# Patient Record
Sex: MALE | Race: WHITE | NOT HISPANIC OR LATINO | Employment: FULL TIME | URBAN - METROPOLITAN AREA
[De-identification: names, ages, dates, MRNs, and addresses within clinical notes are randomized per-mention and may not be internally consistent; named-entity substitution may affect disease eponyms.]

---

## 2017-08-31 ENCOUNTER — OFFICE VISIT - HEALTHEAST (OUTPATIENT)
Dept: FAMILY MEDICINE | Facility: CLINIC | Age: 57
End: 2017-08-31

## 2017-08-31 DIAGNOSIS — K40.90 RIGHT INGUINAL HERNIA: ICD-10-CM

## 2017-08-31 ASSESSMENT — MIFFLIN-ST. JEOR: SCORE: 1857.73

## 2017-09-05 ENCOUNTER — COMMUNICATION - HEALTHEAST (OUTPATIENT)
Dept: SCHEDULING | Facility: CLINIC | Age: 57
End: 2017-09-05

## 2017-09-05 ENCOUNTER — COMMUNICATION - HEALTHEAST (OUTPATIENT)
Dept: FAMILY MEDICINE | Facility: CLINIC | Age: 57
End: 2017-09-05

## 2017-09-07 ENCOUNTER — OFFICE VISIT - HEALTHEAST (OUTPATIENT)
Dept: SURGERY | Facility: CLINIC | Age: 57
End: 2017-09-07

## 2017-09-07 DIAGNOSIS — K40.90 RIGHT INGUINAL HERNIA: ICD-10-CM

## 2017-09-07 ASSESSMENT — MIFFLIN-ST. JEOR: SCORE: 1862.26

## 2017-09-08 ENCOUNTER — AMBULATORY - HEALTHEAST (OUTPATIENT)
Dept: SURGERY | Facility: CLINIC | Age: 57
End: 2017-09-08

## 2017-09-08 ENCOUNTER — RECORDS - HEALTHEAST (OUTPATIENT)
Dept: ADMINISTRATIVE | Facility: OTHER | Age: 57
End: 2017-09-08

## 2017-09-08 DIAGNOSIS — K40.90 RIGHT INGUINAL HERNIA: ICD-10-CM

## 2017-09-21 ENCOUNTER — OFFICE VISIT - HEALTHEAST (OUTPATIENT)
Dept: SURGERY | Facility: CLINIC | Age: 57
End: 2017-09-21

## 2017-09-21 DIAGNOSIS — Z48.89 POSTOPERATIVE VISIT: ICD-10-CM

## 2017-09-21 ASSESSMENT — MIFFLIN-ST. JEOR: SCORE: 1862.26

## 2017-10-04 ENCOUNTER — COMMUNICATION - HEALTHEAST (OUTPATIENT)
Dept: SURGERY | Facility: CLINIC | Age: 57
End: 2017-10-04

## 2018-06-25 ENCOUNTER — OFFICE VISIT - HEALTHEAST (OUTPATIENT)
Dept: FAMILY MEDICINE | Facility: CLINIC | Age: 58
End: 2018-06-25

## 2018-06-25 DIAGNOSIS — R06.02 SHORTNESS OF BREATH: ICD-10-CM

## 2018-06-25 DIAGNOSIS — Z12.5 SCREENING FOR PROSTATE CANCER: ICD-10-CM

## 2018-06-25 DIAGNOSIS — Z77.090 ASBESTOS EXPOSURE: ICD-10-CM

## 2018-06-25 DIAGNOSIS — R07.9 CHEST PAIN: ICD-10-CM

## 2018-06-25 LAB
ALBUMIN SERPL-MCNC: 4 G/DL (ref 3.5–5)
ALP SERPL-CCNC: 57 U/L (ref 45–120)
ALT SERPL W P-5'-P-CCNC: 22 U/L (ref 0–45)
ANION GAP SERPL CALCULATED.3IONS-SCNC: 11 MMOL/L (ref 5–18)
AST SERPL W P-5'-P-CCNC: 17 U/L (ref 0–40)
BILIRUB SERPL-MCNC: 0.9 MG/DL (ref 0–1)
BUN SERPL-MCNC: 17 MG/DL (ref 8–22)
CALCIUM SERPL-MCNC: 9.8 MG/DL (ref 8.5–10.5)
CHLORIDE BLD-SCNC: 110 MMOL/L (ref 98–107)
CO2 SERPL-SCNC: 22 MMOL/L (ref 22–31)
CREAT SERPL-MCNC: 0.94 MG/DL (ref 0.7–1.3)
ERYTHROCYTE [DISTWIDTH] IN BLOOD BY AUTOMATED COUNT: 11.3 % (ref 11–14.5)
GFR SERPL CREATININE-BSD FRML MDRD: >60 ML/MIN/1.73M2
GLUCOSE BLD-MCNC: 97 MG/DL (ref 70–125)
HCT VFR BLD AUTO: 46.9 % (ref 40–54)
HGB BLD-MCNC: 15.4 G/DL (ref 14–18)
LDLC SERPL CALC-MCNC: 164 MG/DL
MCH RBC QN AUTO: 29.4 PG (ref 27–34)
MCHC RBC AUTO-ENTMCNC: 32.8 G/DL (ref 32–36)
MCV RBC AUTO: 90 FL (ref 80–100)
PLATELET # BLD AUTO: 332 THOU/UL (ref 140–440)
PMV BLD AUTO: 7.8 FL (ref 7–10)
POTASSIUM BLD-SCNC: 4.5 MMOL/L (ref 3.5–5)
PROT SERPL-MCNC: 7.3 G/DL (ref 6–8)
PSA SERPL-MCNC: 1.1 NG/ML (ref 0–3.5)
RBC # BLD AUTO: 5.24 MILL/UL (ref 4.4–6.2)
SODIUM SERPL-SCNC: 143 MMOL/L (ref 136–145)
WBC: 10.7 THOU/UL (ref 4–11)

## 2018-06-25 ASSESSMENT — MIFFLIN-ST. JEOR: SCORE: 1857.73

## 2018-06-28 ENCOUNTER — HOSPITAL ENCOUNTER (OUTPATIENT)
Dept: CT IMAGING | Facility: HOSPITAL | Age: 58
Discharge: HOME | End: 2018-06-28
Attending: FAMILY MEDICINE

## 2018-06-28 ENCOUNTER — COMMUNICATION - HEALTHEAST (OUTPATIENT)
Dept: FAMILY MEDICINE | Facility: CLINIC | Age: 58
End: 2018-06-28

## 2018-06-28 DIAGNOSIS — R07.9 CHEST PAIN: ICD-10-CM

## 2018-06-28 DIAGNOSIS — Z77.090 ASBESTOS EXPOSURE: ICD-10-CM

## 2018-07-06 ENCOUNTER — COMMUNICATION - HEALTHEAST (OUTPATIENT)
Dept: FAMILY MEDICINE | Facility: CLINIC | Age: 58
End: 2018-07-06

## 2018-11-14 ENCOUNTER — OFFICE VISIT - HEALTHEAST (OUTPATIENT)
Dept: FAMILY MEDICINE | Facility: CLINIC | Age: 58
End: 2018-11-14

## 2018-11-14 DIAGNOSIS — J31.0 CHRONIC RHINITIS: ICD-10-CM

## 2018-11-14 ASSESSMENT — MIFFLIN-ST. JEOR: SCORE: 1898.55

## 2019-05-20 ENCOUNTER — OFFICE VISIT - HEALTHEAST (OUTPATIENT)
Dept: FAMILY MEDICINE | Facility: CLINIC | Age: 59
End: 2019-05-20

## 2019-05-20 ENCOUNTER — AMBULATORY - HEALTHEAST (OUTPATIENT)
Dept: FAMILY MEDICINE | Facility: CLINIC | Age: 59
End: 2019-05-20

## 2019-05-20 DIAGNOSIS — H02.826 EYELID CYST, LEFT: ICD-10-CM

## 2019-05-20 ASSESSMENT — MIFFLIN-ST. JEOR: SCORE: 1912.16

## 2019-06-11 ENCOUNTER — OFFICE VISIT - HEALTHEAST (OUTPATIENT)
Dept: FAMILY MEDICINE | Facility: CLINIC | Age: 59
End: 2019-06-11

## 2019-06-11 ENCOUNTER — COMMUNICATION - HEALTHEAST (OUTPATIENT)
Dept: FAMILY MEDICINE | Facility: CLINIC | Age: 59
End: 2019-06-11

## 2019-06-11 DIAGNOSIS — S22.42XS CLOSED FRACTURE OF MULTIPLE RIBS OF LEFT SIDE, SEQUELA: ICD-10-CM

## 2019-06-11 ASSESSMENT — MIFFLIN-ST. JEOR: SCORE: 1889.48

## 2019-06-12 ENCOUNTER — COMMUNICATION - HEALTHEAST (OUTPATIENT)
Dept: FAMILY MEDICINE | Facility: CLINIC | Age: 59
End: 2019-06-12

## 2019-06-17 ENCOUNTER — COMMUNICATION - HEALTHEAST (OUTPATIENT)
Dept: FAMILY MEDICINE | Facility: CLINIC | Age: 59
End: 2019-06-17

## 2019-06-17 DIAGNOSIS — S22.42XS CLOSED FRACTURE OF MULTIPLE RIBS OF LEFT SIDE, SEQUELA: ICD-10-CM

## 2019-06-18 ENCOUNTER — AMBULATORY - HEALTHEAST (OUTPATIENT)
Dept: FAMILY MEDICINE | Facility: CLINIC | Age: 59
End: 2019-06-18

## 2019-06-18 DIAGNOSIS — S22.42XS CLOSED FRACTURE OF MULTIPLE RIBS OF LEFT SIDE, SEQUELA: ICD-10-CM

## 2019-06-24 ENCOUNTER — COMMUNICATION - HEALTHEAST (OUTPATIENT)
Dept: FAMILY MEDICINE | Facility: CLINIC | Age: 59
End: 2019-06-24

## 2019-06-24 DIAGNOSIS — S22.42XS CLOSED FRACTURE OF MULTIPLE RIBS OF LEFT SIDE, SEQUELA: ICD-10-CM

## 2019-06-25 ENCOUNTER — RECORDS - HEALTHEAST (OUTPATIENT)
Dept: SCHEDULING | Facility: CLINIC | Age: 59
End: 2019-06-25

## 2019-06-27 ENCOUNTER — COMMUNICATION - HEALTHEAST (OUTPATIENT)
Dept: FAMILY MEDICINE | Facility: CLINIC | Age: 59
End: 2019-06-27

## 2019-09-23 ENCOUNTER — OFFICE VISIT - HEALTHEAST (OUTPATIENT)
Dept: FAMILY MEDICINE | Facility: CLINIC | Age: 59
End: 2019-09-23

## 2019-09-23 DIAGNOSIS — S22.42XS CLOSED FRACTURE OF MULTIPLE RIBS OF LEFT SIDE, SEQUELA: ICD-10-CM

## 2019-09-23 DIAGNOSIS — M25.562 POSTERIOR LEFT KNEE PAIN: ICD-10-CM

## 2019-09-23 DIAGNOSIS — G56.02 CARPAL TUNNEL SYNDROME OF LEFT WRIST: ICD-10-CM

## 2019-09-23 DIAGNOSIS — J90 PLEURAL EFFUSION: ICD-10-CM

## 2019-09-23 DIAGNOSIS — L63.9 ALOPECIA AREATA: ICD-10-CM

## 2020-03-23 ENCOUNTER — OFFICE VISIT - HEALTHEAST (OUTPATIENT)
Dept: FAMILY MEDICINE | Facility: CLINIC | Age: 60
End: 2020-03-23

## 2020-03-23 DIAGNOSIS — J06.9 UPPER RESPIRATORY TRACT INFECTION, UNSPECIFIED TYPE: ICD-10-CM

## 2020-04-02 ENCOUNTER — COMMUNICATION - HEALTHEAST (OUTPATIENT)
Dept: FAMILY MEDICINE | Facility: CLINIC | Age: 60
End: 2020-04-02

## 2021-05-28 NOTE — PROGRESS NOTES
"ASSESMENT AND PLAN:  Diagnoses and all orders for this visit:    Eyelid cyst, left  Counseled the patient on options including observation and intervention.  He would like to proceed with intervention.  We reviewed the risks and benefits of proceeding and the patient agreed to proceed.  The skin was prepped with rubbing alcohol, the eye was shielded with a gloved hand and gauze, the cyst was ruptured using a sterile TB syringe and produced clear liquid and a scant amount of blood.  This resulted in complete resolution of the cyst, aftercare instructions discussed with the patient and we reviewed indications for routine and emergent follow-up.        SUBJECTIVE: 58-year-old male with a 6-week history of a slowly increasing in size spot on the nasal margin of his left upper eyelid.  He is been trying warm compresses and despite this it has been getting bigger.  It starting to bother him.  No fevers, no spreading redness, no drainage, there is not disruptive to his vision.  It is not painful.  However, he can feel that it is there and it is uncomfortable for him.    Past Medical History:   Diagnosis Date     Basal Cell Carcinoma Of The Skin Of The Trunk     Created by Conversion      Inguinal hernia     on the right     Patient Active Problem List   Diagnosis     Basal Cell Carcinoma Of The Skin Of The Trunk     Benign Pigmented Nevus     Epidermal Inclusion Cyst     Asbestos exposure     Current Outpatient Medications   Medication Sig Dispense Refill     multivitamin with minerals (THERA-M) 9 mg iron-400 mcg Tab tablet Take 1 tablet by mouth daily.       fluticasone (FLONASE) 50 mcg/actuation nasal spray 1 spray into each nostril daily. 16 g 6     No current facility-administered medications for this visit.      Social History     Tobacco Use   Smoking Status Never Smoker   Smokeless Tobacco Never Used       OBJECTICE: /70   Pulse 84   Temp 97.7  F (36.5  C) (Oral)   Resp 20   Ht 5' 9\" (1.753 m)   Wt (!) " 244 lb (110.7 kg)   SpO2 96%   BMI 36.03 kg/m       No results found for this or any previous visit (from the past 24 hour(s)).     GEN-alert, appropriate, in no apparent distress   Eyes- normal eye exam bilaterally with the limitations of office equipment and undilated pupil.   SKIN-the left upper eyelid at the nasal margin has a 2 mm diameter clear raised cyst with no surrounding induration or erythema.      Emerson Lai

## 2021-05-29 NOTE — TELEPHONE ENCOUNTER
Received FMLA paperwork from pt.  CMT filled out what can,  Highlighted what MD needs to fill out.  Gave to CA who placed with apt paperwork. Thanks.

## 2021-05-29 NOTE — PROGRESS NOTES
ASSESMENT AND PLAN:  Diagnoses and all orders for this visit:    Closed fracture of multiple ribs of left side, sequela  Extensive counseling today with the patient around the use of the below prescribed medication for the most severe breakthrough pain, he is going to start the transition to ibuprofen 600 mg per dose with food every 6 hours as needed for his mainstay of pain control and will reserve the refill on his oxycodone acetaminophen for the only the more severe pain or pain disrupting his sleep not controlled with ibuprofen.  Reviewed the emergency room records extensively with the patient, he has multiple rib fractures and is experiencing severe pain as detailed below, he was cautioned on the risks and benefits of the medication as prescribed below and agrees not to drive after taking the medication.    Extensive counseling care coordination around his work-related documentation required for having time off work for the sequelae from this injury.  The patient works in a very physically demanding job as a  where he has to do a lot of climbing and pulling himself up with his upper extremities, even doing low force light movements are causing him some severe pain.  Therefore, he is not able to do any components of his job and he does get some cognitive/alertness related issues related to taking the pain medications.  4 separate packets were completed with the patient detailing his medical situation and expected time off work, we are estimating his return to work date as 7/13/2019-please see scanned copies of those packets for further details.    Patient counseled extensively on very slow return to everyday activities and precautions for avoiding reinjury or exacerbation of his pain.  Also counseled on the importance of continued use of the incentive spirometry to reduce pneumonia risk.  We discussed indications for routine and emergent follow-up.    -     oxyCODONE-acetaminophen  (PERCOCET/ENDOCET) 5-325 mg per tablet; Take 1 tablet by mouth every 4 (four) hours as needed for pain.  Dispense: 30 tablet; Refill: 0    Over 40 minutes of total face-to-face time, more than half in counseling and coordination of care on the above.     SUBJECTIVE: 58-year-old male who was injured in a severe fall, see emergency room notes for details.  He broke 4 ribs.  Patient reports that he discussed admission to the hospital with the ER provider but patient decided he wants to manage this at home.  He does feel like there has been some day-to-day improvement in the severity of his pain but he continues to experience severe left-sided chest wall pain that is made intolerable with things like laughing or coughing as well as some movements of the arms and trunk.  Patient also reports that he has had some very severe pain with spasming of the muscles in the trunk.  He has had good improvement in the most severe pain from the oxycodone acetaminophen that was prescribed by the ER physician.  He tolerates the medication well.  He has not had fevers or shortness of breath.  He is been doing the incentive spirometry as directed and doing well with this.    Past Medical History:   Diagnosis Date     Basal Cell Carcinoma Of The Skin Of The Trunk     Created by Conversion      Inguinal hernia     on the right     Patient Active Problem List   Diagnosis     Basal Cell Carcinoma Of The Skin Of The Trunk     Benign Pigmented Nevus     Epidermal Inclusion Cyst     Asbestos exposure     Current Outpatient Medications   Medication Sig Dispense Refill     multivitamin with minerals (THERA-M) 9 mg iron-400 mcg Tab tablet Take 1 tablet by mouth daily.       oxyCODONE-acetaminophen (PERCOCET/ENDOCET) 5-325 mg per tablet Take 1 tablet by mouth every 4 (four) hours as needed for pain. 30 tablet 0     fluticasone (FLONASE) 50 mcg/actuation nasal spray 1 spray into each nostril daily. 16 g 6     No current facility-administered  "medications for this visit.      Social History     Tobacco Use   Smoking Status Never Smoker   Smokeless Tobacco Never Used       OBJECTICE: /80 (Patient Site: Right Arm, Patient Position: Sitting, Cuff Size: Adult Large)   Pulse 80   Temp 97.7  F (36.5  C) (Oral)   Resp 16   Ht 5' 9\" (1.753 m)   Wt (!) 239 lb (108.4 kg)   BMI 35.29 kg/m       No results found for this or any previous visit (from the past 24 hour(s)).     GEN-alert, appropriate, in no apparent distress   Musculoskeletal- tenderness to light touch of the left chest wall   CV-regular rate and rhythm   RESP-lungs clear to auscultation   ABDOMINAL-soft and nontender to palpation with no palpable mass   SKIN-extensive ecchymoses over the left chest wall skin integrity.      Emerson Lai          "

## 2021-05-29 NOTE — TELEPHONE ENCOUNTER
Final form faxed to [facility].  Completed what could.  Attached med list and ER record.  Highlighted what PCP needs to complete.  Pt coming in to complete first page of form. Thanks.

## 2021-05-29 NOTE — TELEPHONE ENCOUNTER
[employer] and one other form faxed to [facility] for pt apt today at 240 pm.  Filled out what [facility] could and placed with other paperwork. PCP and CA aware.  Thanks.

## 2021-05-29 NOTE — TELEPHONE ENCOUNTER
Patient calling again requesting refill of pain medication: oxycodone for broken ribs.  Patient states he called early this morning.     Pharmacy confirmed with caller.    Sheba Sin RN  Triage Nurse Advisor

## 2021-05-29 NOTE — TELEPHONE ENCOUNTER
Refill Request  Did you contact pharmacy: No  Medication name: percocet    Who prescribed the medication: [place]     Pharmacy Name and Location: [facility]  Is patient out of medication: Yes  Patient notified refills processed in 72 hours:  yes  Okay to leave a detailed message: yes

## 2021-05-29 NOTE — TELEPHONE ENCOUNTER
Name of form/paperwork: Other:  workability form is due again in two weeks.  Does he need to schedule an appointment to get the paperwork completed? Please advise.   Have you been seen for this request: No  Do we have the form: Patient will either drop off or bring in if need appointment.   When is form needed by: two weeks from now  How would you like the form returned: see form  Fax Number: see form  Patient Notified form requests are processed in 3-5 business days: NA  (If patient needs form sooner, please note that in this message.)  Okay to leave a detailed message? Yes [phone]

## 2021-05-29 NOTE — TELEPHONE ENCOUNTER
Upcoming Appointment Question  When is the appointment: Today, 2:40  What is your appointment for?: EDF  Who is your appointment scheduled with?: PCP only, Dr Lai  What is your question/concern?: Patient requests someone on Dr Lai team contact him as soon as possible. He has electronic documents containing forms he needs to get to the doctor and is looking for a way to get them over.  Okay to leave a detailed message?: Yes

## 2021-05-29 NOTE — TELEPHONE ENCOUNTER
Controlled Substance Refill Request  Medication:   Requested Prescriptions     Pending Prescriptions Disp Refills     oxyCODONE-acetaminophen (PERCOCET/ENDOCET) 5-325 mg per tablet 30 tablet 0     Sig: Take 1 tablet by mouth every 4 (four) hours as needed for pain.     Date Last Fill: 6/18/2019    Pharmacy: [id number] (confirmed pharmacy with encounter creator)   Submit electronically to pharmacy  Controlled Substance Agreement on File:   Encounter-Level CSA Scan Date:    There are no encounter-level csa scan date.       Last office visit: 6/11/2019 Emerson Lai MD

## 2021-05-29 NOTE — TELEPHONE ENCOUNTER
Controlled Substance Refill Request  Medication Name:   Requested Prescriptions     Pending Prescriptions Disp Refills     oxyCODONE-acetaminophen (PERCOCET/ENDOCET) 5-325 mg per tablet 30 tablet 0     Sig: Take 1 tablet by mouth every 4 (four) hours as needed for pain.     Date Last Fill: 6/11/19  Pharmacy: [facility]      Submit electronically to pharmacy  Controlled Substance Agreement Date Scanned:   Encounter-Level CSA Scan Date:    There are no encounter-level csa scan date.       Last office visit with prescriber/PCP: 6/11/2019 Emerson Lai MD OR same dept: 6/11/2019 Emerson Lai MD OR same specialty: 6/11/2019 Emerson Lai MD  Last physical: Visit date not found Last MTM visit: Visit date not found

## 2021-05-29 NOTE — TELEPHONE ENCOUNTER
Called pt to relay did not receive 4th form from [employer].  Waiting on form and pt asked to be called when form received.  Relayed PCP is gone next week.  Pt will  form and wants copy of this form when completed. Thanks.

## 2021-05-29 NOTE — TELEPHONE ENCOUNTER
Called pt who states faxed two forms yesterday - [employer] disability and STD from [employer] to [facility].  Lelo is looking but did not find them.  Pt has 4 more forms that were emailed to him.  Unfortunately will need them all faxed.  Gave pt CMT direct fax [phone] and pt said he may ask for hard copies and drop off next week.      Just ADOLPH for PCP and CA.  Thanks.

## 2021-05-30 NOTE — TELEPHONE ENCOUNTER
Called pt to relay PCP message. Understood. Relayed forms take 3-5 days to complete and will call when ready.  Pt would like a copy for self. Thanks.

## 2021-05-30 NOTE — TELEPHONE ENCOUNTER
Patient left [employer] Form to , stating Dr. Lai would be expecting it.    Given to Provider.

## 2021-05-30 NOTE — TELEPHONE ENCOUNTER
Faxed the completed paperwork. Left detail message on pt's voicemail to return call pt want the paperwork back.

## 2021-05-31 VITALS — BODY MASS INDEX: 34.51 KG/M2 | WEIGHT: 233 LBS | HEIGHT: 69 IN

## 2021-05-31 VITALS — HEIGHT: 69 IN | WEIGHT: 233 LBS | BODY MASS INDEX: 34.51 KG/M2

## 2021-05-31 VITALS — HEIGHT: 69 IN | WEIGHT: 232 LBS | BODY MASS INDEX: 34.36 KG/M2

## 2021-06-01 VITALS — WEIGHT: 232 LBS | BODY MASS INDEX: 34.36 KG/M2 | HEIGHT: 69 IN

## 2021-06-01 NOTE — PROGRESS NOTES
Assessment/Plan:        1. Closed fracture of multiple ribs of left side, sequela/  Pleural effusion  - history of multiple rib fracture due to a fall  Seen in ED initially on 6/5/19    Exam findings were discussed   Plan:   - XR Chest 2 Views  We will follow-up to the results of the study and manage accordingly.      2.Posterior left knee pain  Consider strain vs Baker's cyst   He plans to watch it for now, and follow up with any worsening symptoms.        3. Alopecia areata  Exam findings were discussed  Likely due to stressed caused by #1  - observation     4. Carpal tunnel syndrome of left wrist  Exam findings and pathophysiology were discussed   Recommended splinting for 4-6 wks, but due to his work opted out for now.   Plans to observe for now and follow up upon worsening.     At the conclusion of the encounter the plan of care, disposition and all questions were answered and reviewed, and the patient acknowledged understanding and was involved in the decision making regarding the overall care plan.           Subjective:    Patient ID:   Fortino Ashton is a 59 y.o. male here for the following issues:       Follow-up on rib pain/multiple rib fracture due to a fall  Seen in ED initially on 6/5/19, treated with Percocet.  X-ray of the chest and posterior ribs showed evidence of two anterior left rib fractures and two posterior left rib fractures.  With also evidence of a small pleural effusion in the left lung which is likely a hemothorax          Hair loss noted for the past month  Having a two almost palm size patchy balded area on the back of the head        Numbness and tingling of the left hand fingertips since early August  He reports to spending some time in the [place] wondering if that had to do with it.            Posterior left leg pain for the past 4 days worse in the morning  For no good reason, starting to feel sore.   Worse with more using it extending into the calf. 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               Review of Systems  Allergy: reviewed  General : negative  A complete 10 point review of systems was obtained and is negative other than what is stated in the HPI.        The following patient's history were reviewed and updated as appropriate:  I have reviewed the patient's medical, surgical , and social history in detail         Outpatient Encounter Medications as of 9/23/2019   Medication Sig Dispense Refill     multivitamin with minerals (THERA-M) 9 mg iron-400 mcg Tab tablet Take 1 tablet by mouth daily.       [DISCONTINUED] fluticasone (FLONASE) 50 mcg/actuation nasal spray 1 spray into each nostril daily. 16 g 6     [DISCONTINUED] oxyCODONE-acetaminophen (PERCOCET/ENDOCET) 5-325 mg per tablet Take 1 tablet by mouth every 4 (four) hours as needed for pain. 30 tablet 0     No facility-administered encounter medications on file as of 9/23/2019.          Objective:   /76 (Patient Site: Right Arm, Patient Position: Sitting, Cuff Size: Adult Large)   Pulse 84   Wt (!) 244 lb (110.7 kg)   SpO2 96%   BMI 36.03 kg/m        Physical Exam  General Appearance:    Alert, well hydrated, no distress,    Neck:   Supple, symmetrical, trachea midline, no adenopathy;        thyroid:  No enlargement/tenderness/nodules; no carotid    bruit or JVD   Chest wall/ Lungs:     Clear to auscultation bilaterally, respirations unlabored, no overlying bruising or ecchymosis, tender to palpation on the left side.    Heart:    Regular rate and rhythm, S1 and S2 normal, no murmur, rub   or gallop   Abdomen:     Soft, non-tender, normal bowel sounds, no rebound or guarding, no masses, no organomegaly   Left wrist/ Left knee:    Left wrist: normal to inspection, + phalen's test, normal DTR, left knee:   Normal to inspection, atraumatic, no edema, palpable tenderness to the posterior knee, no calf swelling   Skin:   Two almost palm  sized near bald patchy areas on the back side the head, Skin color, texture, turgor normal, no rashes or lesions

## 2021-06-02 VITALS — WEIGHT: 241 LBS | HEIGHT: 69 IN | BODY MASS INDEX: 35.7 KG/M2

## 2021-06-02 VITALS — HEIGHT: 69 IN | WEIGHT: 244 LBS | BODY MASS INDEX: 36.14 KG/M2

## 2021-06-02 VITALS — BODY MASS INDEX: 35.4 KG/M2 | WEIGHT: 239 LBS | HEIGHT: 69 IN

## 2021-06-03 VITALS
WEIGHT: 244 LBS | SYSTOLIC BLOOD PRESSURE: 122 MMHG | BODY MASS INDEX: 36.03 KG/M2 | OXYGEN SATURATION: 96 % | HEART RATE: 84 BPM | DIASTOLIC BLOOD PRESSURE: 76 MMHG

## 2021-06-07 NOTE — TELEPHONE ENCOUNTER
Called pt to relay CMT fax number.  FMLA is related to note under Media by PCP.  Forms not coming until Monday 4/6/20.  Thanks.

## 2021-06-07 NOTE — PROGRESS NOTES
"ASSESMENT AND PLAN:  Diagnoses and all orders for this visit:    Upper respiratory tract infection, unspecified type  Likely a viral upper respiratory infection.  No lower respiratory symptoms on review of systems as detailed below.  Detailed counseling with the patient on differential diagnosis and extra precautions with transmission precautions given the current covid-19 pandemic.  We are going to keep him off work for a more prolonged period than would be usual with these types of symptoms just to take those precautions.  A handwritten note was faxed to his employer stating that he has \"medically excused absences from 3/20/2020 through 4/2/2020 with return to work with no restrictions on 4/3/2020.\"      Reviewed the risks and benefits of the treatment plan with the patient and/or caregiver and we discussed indications for routine and emergent follow-up.  This was a telephone visit because of ongoing concerns around the pandemic.  Total telephone time with the patient was 13 minutes.        SUBJECTIVE: 59-year-old male with a 4-day history of sore throat, mild cough, mild congestion.  No fevers or chills.  No shortness of breath or chest pain.  Patient had previously been getting care for a severe rib fracture, see previous note for details, his pain from that had resolved completely prior to onset of the symptoms.  His alopecia that he was seen for previously has also been improving.  His symptoms have been improving over the last day, patient is mainly concerned about the possibility of covid-19 and how she he should handle returning to work given the possibility of exposing other people.    Past Medical History:   Diagnosis Date     Basal Cell Carcinoma Of The Skin Of The Trunk     Created by Conversion      Inguinal hernia     on the right     Patient Active Problem List   Diagnosis     Basal Cell Carcinoma Of The Skin Of The Trunk     Benign Pigmented Nevus     Epidermal Inclusion Cyst     Asbestos exposure "     Current Outpatient Medications   Medication Sig Dispense Refill     multivitamin with minerals (THERA-M) 9 mg iron-400 mcg Tab tablet Take 1 tablet by mouth daily.       No current facility-administered medications for this visit.      Social History     Tobacco Use   Smoking Status Never Smoker   Smokeless Tobacco Never Used             Emerson Lai

## 2021-06-07 NOTE — TELEPHONE ENCOUNTER
Called pt to relay [facility] paperwork completed and faxed back to [facility].  Successful fax confirmation received.  Thanks.

## 2021-06-12 NOTE — PROGRESS NOTES
"ASSESMENT AND PLAN:  Diagnoses and all orders for this visit:    Right inguinal hernia  -     Ambulatory referral to General Surgery  Additional exam done as detailed below, if the patient requires surgery and requires a preoperative evaluation, he is cleared to proceed with surgery with any appropriate anesthesia based on today's examination as documented below.  She was instructed on avoidance of aspirin and NSAIDs from now until surgery.      Patient was also given a note taking him off work.  Patient requested that I include in the note his diagnosis and that he has been referred to a surgeon.  The note stated that I estimated he would be off work 2-3 weeks but return to work details would need to be per the surgeon's recommendation.    Health maintenance  Patient gets his yearly health maintenance visits through his \"company doctor\" at his job and reports that he has been up-to-date on all of his immunizations.  We were able to get his colonoscopy report and reviewed that today- colonoscopy report indicates that he had colonoscopy in May 2016.  He had 2 adenomatous polyps at that time and it was recommended he have another colonoscopy in 5 years.    SUBJECTIVE: 56-year-old male with a three-week history of pain in his right groin and testicle.  It started while he was on a camping trip in [place] and had been doing a lot of packing and unpacking and moderate lifting and physical activity.  However, there was no specific heavy straining or injury that he can recall.  The pain is gotten progressively worse over the last week.  He now has moderate to severe pain in his right groin that waxes and wanes.  He describes it as a burning pain with sometimes a sharp stabbing quality.  The pain does get worse when he strains his abdominal muscles or lifts.  He does have a physically active job that requires a lot of moving around and physical work and he was unable to complete his work over the last few days because of " "the worsening pain.  The pain improves if he is able to support his scrotum.  Repositioning himself can be painful at times.  He does not have any past history of any similar symptoms and has not been having any dysuria or hematuria.  No constipation.  No new sexual partners.    Review of systems: No chest pain or shortness of breath.  No fevers or vomiting.  No constipation or diarrhea.  No blood in the urine or blood in the stool.  Remainder of review of systems is as above are negative.    Past Medical History:   Diagnosis Date     Basal Cell Carcinoma Of The Skin Of The Trunk     Created by Conversion      Patient Active Problem List   Diagnosis     Basal Cell Carcinoma Of The Skin Of The Trunk     Benign Pigmented Nevus     Epidermal Inclusion Cyst     No current outpatient prescriptions on file.     No current facility-administered medications for this visit.      History   Smoking Status     Never Smoker   Smokeless Tobacco     Not on file       OBJECTICE: /80 (Patient Site: Right Arm, Patient Position: Sitting, Cuff Size: Adult Regular)  Pulse 70  Temp 97.8  F (36.6  C) (Oral)   Resp 18  Ht 5' 9\" (1.753 m)  Wt (!) 232 lb (105.2 kg)  BMI 34.26 kg/m2     No results found for this or any previous visit (from the past 24 hour(s)).     GEN-alert, appropriate, in no apparent distress   HEENT-oropharynx is clear, neck is supple with no palpable mass or lymphadenopathy   CV-regular rate and rhythm with no murmur   RESP-lungs clear to auscultation   ABDOMINAL-soft, nontender, no palpable masses organomegaly   Genitourinary-normal testicular exam bilaterally, normal external genitalia, right inguinal hernia on exam, no palpable left inguinal hernia.     EXTREM-warm with no edema   SKIN-no vesicles or ulcers.      Emerson Lai          "

## 2021-06-12 NOTE — PROGRESS NOTES
HPI:  Fortino Ashton is a 56 y.o. male who was referred to me by Emerson Lai MD for an inguinal hernia. He  presents today with complaints of significant right inguinal pain for the past 4 weeks.  Pain began during a camping trip when he is doing a lot of heavy physical activity.  It persisted after this camping trip, and became increasingly severe, with a burning pain in the right groin that radiated down to his right testicle.  He notes this pain is currently exacerbated by sitting upright, driving and is relieved by standing or laying flat.  Denies any symptoms of dysuria or recent sexual contact.  Due to the severity of pain, he presented to the emergency department 2 days ago for evaluation where he was found to have a nonincarcerated right inguinal hernia.  He was given a prescription for narcotic pain medication which she reports has been quite helpful.    No prior intra-abdominal surgeries.    Allergies:Pollen    Past Medical History:   Diagnosis Date     Basal Cell Carcinoma Of The Skin Of The Trunk     Created by Conversion      Inguinal hernia     on the right       Past Surgical History:   Procedure Laterality Date     BASAL CELL CARCINOMA EXCISION         CURRENT MEDS:  Current Outpatient Prescriptions   Medication Sig Dispense Refill     acetaminophen (TYLENOL) 500 MG tablet Take 500 mg by mouth every 6 (six) hours as needed for pain.       docusate sodium (COLACE) 100 MG capsule Take 1 capsule (100 mg total) by mouth 2 (two) times a day as needed (Take when using oxycodone for severe pain). 20 capsule 0     multivitamin with minerals (THERA-M) 9 mg iron-400 mcg Tab tablet Take 1 tablet by mouth daily.       oxyCODONE-acetaminophen (PERCOCET) 5-325 mg per tablet Take 1-2 tablets by mouth every 6 (six) hours as needed (Severe pain). 15 tablet 0     No current facility-administered medications for this visit.        No family history on file.,  History not pertinent to current surgical issue     reports  "that he has never smoked. He has never used smokeless tobacco. He reports that he drinks alcohol. He reports that he does not use illicit drugs.    Review of Systems -   The 10 point review of systems  is within normal limits except for as mentioned above in the HPI.  General ROS: No complaints or constitutional symptoms  Skin: No complaints or symptoms   Hematologic/Lymphatic: No symptoms or complaints  Psychiatric: No symptoms or complaints  Endocrine: No excessive fatigue, no hypermetabolic symptoms reported  Respiratory ROS: no cough, shortness of breath, or wheezing  Cardiovascular ROS: no chest pain or dyspnea on exertion  Gastrointestinal ROS: As per HPI  Musculoskeletal ROS: no recent injuries reported  Neurological ROS: no focal neurologic defects reported.        BP (!) 154/99  Pulse 86  Ht 5' 9\" (1.753 m)  Wt (!) 233 lb (105.7 kg)  SpO2 97%  BMI 34.41 kg/m2  Body mass index is 34.41 kg/(m^2).    EXAM:  General : Alert, cooperative, appears stated age   Skin: Skin color, texture, turgor normal, no rashes or lesions   Lymphatic: No obvious adenopathy, no swelling   Eyes: No scleral icterus, pupils equal  HENT: no traumatic injury to the head or face, no gross abnormalities  Lungs: Normal respiratory effort, breath sounds equal bilaterally  Heart: Regular rate and rhythm  Abdomen: Soft, obese.  No discomfort with palpation the left groin, no palpable left inguinal hernia.  On the right, there is tenderness to palpation of both testicle and right external inguinal ring, with a palpable descent of hernia sac with Valsalva.  Musculoskeletal: No obvious swelling,  Neurologic: Grossly intact      Assessment/Plan:   1. Right inguinal hernia        Fortino Ashton is a 56 y.o. male with a very symptomatic right inguinal hernia inguinal hernia.  I have discussed the pathophysiology of inguinal hernias at length as well as the  surgical and non-operative management strategies.      In particular, the risks and " benefits of laparoscopic vs. open inguinal hernia surgery were explained in detail which include, but are not limited to, bleeding, infection of the mesh, recurrence of the hernia, chronic pain, poor cosmesis, the need for reoperative intervention, the possibility of conversion from a laparoscopic approach to an open approach, subcutaneous emphysema, injury to vital structures,  blood clots, heart attack, stroke and death.  Additionally, the risks of observation were also discussed in detail which include, but are not limited to, chronic pain, enlargement of the hernia, incarceration, strangulation and death.      He understands everything that was discussed and has consented to proceed with surgery.   We will plan on scheduling a laparoscopic right inguinal hernia repair at his desired date.  He is already obtained clearance for anesthesia by his PCP.      Dom Kim MD  [phone]  [facility] Department of Surgery

## 2021-06-13 NOTE — PROGRESS NOTES
"HPI: Pt is here for follow up of a lap right inguinal hernia repair.   he is doing well.  Pain is well controlled.  No difficulties with the surgical wound/wounds.  he is eating well and denies fever and chills.         /90  Pulse 80  Ht 5' 9\" (1.753 m)  Wt (!) 233 lb (105.7 kg)  SpO2 96%  BMI 34.41 kg/m2    EXAM:  GENERAL:Appears well  ABDOMEN:  Soft, +BS  SURGICAL WOUNDS:  Incisions healing well, no enduration or drainage.        Assessment/Plan: . Doing well after surgery and should follow up as needed.  Azra Martinez PA-C  [facility] Department of Surgery    "

## 2021-06-16 PROBLEM — Z77.090 ASBESTOS EXPOSURE: Status: ACTIVE | Noted: 2018-06-25

## 2021-06-18 NOTE — PROGRESS NOTES
ASSESMENT AND PLAN:  Diagnoses and all orders for this visit:    Chest pain, Shortness of breath, history of asbestos exposure and other chemical exposures as well as smoking history  -     CT Chest With Contrast; Future; Expected date: 6/25/18  -     Comprehensive Metabolic Panel  -     HM2(CBC w/o Differential)  -     LDL Cholesterol, Direct      Screening for prostate cancer  -     PSA (Prostatic-Specific Antigen), Annual Screen  I recommended the patient come in for a full health maintenance visit yearly.          SUBJECTIVE: 57-year-old male who had a blood pressure reading of 146 systolic at his physical that is done regularly for his employer.  He comes in today for follow-up on that.  Blood pressure is improved.  Patient reports that over the past 1-2 years, he has been getting intermittent mild symptoms of shortness of breath when he lays on his side when trying to sleep.  In addition, he gets a mild discomfort across the upper abdomen and lower chest along the lower edge of his rib cage anteriorly.  This sometimes worsens with coughing.  He is worried because he has significant pulmonary exposure history, he smoked for about 20 years, averaging less than one pack per day.  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  No hemoptysis.  No exertional trigger of his chest symptoms or decreased exercise tolerance.    Past Medical History:   Diagnosis Date     Basal Cell Carcinoma Of The Skin Of The Trunk     Created by Conversion      Inguinal hernia     on the right     Patient Active Problem List   Diagnosis     Basal Cell Carcinoma Of The Skin Of The Trunk     Benign Pigmented Nevus     Epidermal Inclusion Cyst     Asbestos exposure     Current Outpatient Prescriptions   Medication Sig Dispense Refill     acetaminophen (TYLENOL) 500 MG tablet Take 500 mg by mouth every 6 (six) hours as needed for pain.       multivitamin with  "minerals (THERA-M) 9 mg iron-400 mcg Tab tablet Take 1 tablet by mouth daily.       No current facility-administered medications for this visit.      History   Smoking Status     Never Smoker   Smokeless Tobacco     Never Used       OBJECTICE: /66 (Patient Site: Left Arm, Patient Position: Sitting, Cuff Size: Adult Large)  Pulse 87  Temp 98.8  F (37.1  C) (Oral)   Resp 20  Ht 5' 9\" (1.753 m)  Wt (!) 232 lb (105.2 kg)  SpO2 97%  BMI 34.26 kg/m2     Recent Results (from the past 24 hour(s))   HM2(CBC w/o Differential)    Collection Time: 06/25/18  4:10 PM   Result Value Ref Range    WBC 10.7 4.0 - 11.0 thou/uL    RBC 5.24 4.40 - 6.20 mill/uL    Hemoglobin 15.4 14.0 - 18.0 g/dL    Hematocrit 46.9 40.0 - 54.0 %    MCV 90 80 - 100 fL    MCH 29.4 27.0 - 34.0 pg    MCHC 32.8 32.0 - 36.0 g/dL    RDW 11.3 11.0 - 14.5 %    Platelets 332 140 - 440 thou/uL    MPV 7.8 7.0 - 10.0 fL        GEN-alert, appropriate, in no apparent distress   CV-regular rate and rhythm with no murmur   RESP-lungs clear to auscultation   ABDOMINAL-soft, nontender, no palpable mass organomegaly   EXTREM-warm with no edema   SKIN-no ulcers or vesicles overlying his areas of symptoms      Emerson Lai          "

## 2021-06-21 NOTE — PROGRESS NOTES
ASSESMENT AND PLAN:  Diagnoses and all orders for this visit:    Chronic rhinitis  Likely allergic trigger.  Reviewed differential diagnosis with the patient and discussed treatment options.  We are going to use medication as prescribed below.  Discussed risks and benefits of the medication and indications for follow-up.  If he responds poorly to the treatment will consider allergy testing.  -     fluticasone (FLONASE) 50 mcg/actuation nasal spray; 1 spray into each nostril daily.  Dispense: 16 g; Refill: 6          SUBJECTIVE: 58-year-old male who I had seen previously for some cough, chest pain, and shortness of breath issues.  He had a negative workup and all of his symptoms have resolved completely.  Over this past several months he has been getting intermittent symptoms of nasal congestion, watery runny nose, mildly itchy eyes bilaterally.  There is been no associated wheezing or shortness of breath or fever or chest pain.  The symptoms started around the time that his roommate moved in with dogs.  The patient does have a history of some allergies related to cat dander.  In the past, he is gotten good relief of his symptoms with Claritin but the current symptoms have not improved adequately with Claritin.  He feels like he has nasal congestion every night and it is difficult for him to be through his nose.    Past Medical History:   Diagnosis Date     Basal Cell Carcinoma Of The Skin Of The Trunk     Created by Conversion      Inguinal hernia     on the right     Patient Active Problem List   Diagnosis     Basal Cell Carcinoma Of The Skin Of The Trunk     Benign Pigmented Nevus     Epidermal Inclusion Cyst     Asbestos exposure     Current Outpatient Medications   Medication Sig Dispense Refill     multivitamin with minerals (THERA-M) 9 mg iron-400 mcg Tab tablet Take 1 tablet by mouth daily.       acetaminophen (TYLENOL) 500 MG tablet Take 500 mg by mouth every 6 (six) hours as needed for pain.        "fluticasone (FLONASE) 50 mcg/actuation nasal spray 1 spray into each nostril daily. 16 g 6     No current facility-administered medications for this visit.      Social History     Tobacco Use   Smoking Status Never Smoker   Smokeless Tobacco Never Used       OBJECTICE: /70 (Patient Site: Right Arm, Patient Position: Sitting, Cuff Size: Adult Large)   Pulse 77   Temp 98  F (36.7  C) (Oral)   Resp 16   Ht 5' 9\" (1.753 m)   Wt (!) 241 lb (109.3 kg)   SpO2 96%   BMI 35.59 kg/m       No results found for this or any previous visit (from the past 24 hour(s)).     GEN-alert, appropriate, in no apparent distress   HEENT-puffiness and redness of the nasal mucosa bilaterally, neck is supple with no palpable mass or lymphadenopathy   CV-regular rate and rhythm with no murmur   RESP-lungs clear to auscultation   SKIN-no rash or hives      Emerson Lai          "

## 2021-06-23 ENCOUNTER — RECORDS - HEALTHEAST (OUTPATIENT)
Dept: ADMINISTRATIVE | Facility: OTHER | Age: 61
End: 2021-06-23

## 2021-07-28 ENCOUNTER — OFFICE VISIT (OUTPATIENT)
Dept: FAMILY MEDICINE | Facility: CLINIC | Age: 61
End: 2021-07-28
Payer: COMMERCIAL

## 2021-07-28 VITALS
WEIGHT: 235.5 LBS | BODY MASS INDEX: 35.69 KG/M2 | OXYGEN SATURATION: 100 % | HEART RATE: 93 BPM | SYSTOLIC BLOOD PRESSURE: 136 MMHG | HEIGHT: 68 IN | TEMPERATURE: 98.3 F | DIASTOLIC BLOOD PRESSURE: 76 MMHG

## 2021-07-28 DIAGNOSIS — S46.811A STRAIN OF RIGHT DELTOID MUSCLE, INITIAL ENCOUNTER: Primary | ICD-10-CM

## 2021-07-28 PROCEDURE — 99214 OFFICE O/P EST MOD 30 MIN: CPT | Performed by: FAMILY MEDICINE

## 2021-07-28 ASSESSMENT — MIFFLIN-ST. JEOR: SCORE: 1859.47

## 2021-08-01 NOTE — PROGRESS NOTES
"    Assessment & Plan     Strain of right deltoid muscle, initial encounter    Functional abilities form was completed and faxed to [employer].    FMLA form was also received and is being completed and faxed to [facility].      34 minutes spent on the date of the encounter doing chart review, patient visit and documentation regarding deltoid muscle strain.       BMI:   Estimated body mass index is 35.69 kg/m  as calculated from the following:    Height as of this encounter: 1.73 m (5' 8.11\").    Weight as of this encounter: 106.8 kg (235 lb 8 oz).           Return in about 1 year (around 7/28/2022) for Routine preventive.    Galen Hernandez MD, MD  [facility] LESLY rFeitas is a 60 year old who presents for the following health issues     HPI     On July 17 he was doing yard work, and afterward he experienced right shoulder pain.  He was unable to elevate his right arm above shoulder height without pain.    His work involves operating a locomotive engine.  He was unable to safely climb a ladder up to the locomotive engine cab, therefore he was unable to work.  He was instructed by his supervisor to remain off work until the shoulder had healed sufficiently.  He has been having gradual improvement, and anticipates that he will be able to return to service on August 3.    He will retire September 9, and following that, he will move to [place].        Review of Systems   No fever or cough.      Objective    /76 (BP Location: Left arm, Patient Position: Sitting, Cuff Size: Adult Large)   Pulse 93   Temp 98.3  F (36.8  C) (Oral)   Ht 1.73 m (5' 8.11\")   Wt 106.8 kg (235 lb 8 oz)   SpO2 100%   BMI 35.69 kg/m    Body mass index is 35.69 kg/m .  Physical Exam   Heart normal  Lungs normal  Right shoulder has pain with abduction above 90 degrees.  No bony abnormality of the clavicle, scapula or humerus.  Empty can test negative.  Biceps and triceps function normal.   " strength normal.